# Patient Record
Sex: FEMALE | Race: BLACK OR AFRICAN AMERICAN
[De-identification: names, ages, dates, MRNs, and addresses within clinical notes are randomized per-mention and may not be internally consistent; named-entity substitution may affect disease eponyms.]

---

## 2019-11-14 ENCOUNTER — HOSPITAL ENCOUNTER (EMERGENCY)
Dept: HOSPITAL 75 - ER FS | Age: 19
Discharge: HOME | End: 2019-11-14
Payer: SELF-PAY

## 2019-11-14 VITALS — HEIGHT: 66.02 IN | BODY MASS INDEX: 22.14 KG/M2 | WEIGHT: 137.79 LBS

## 2019-11-14 DIAGNOSIS — K60.2: Primary | ICD-10-CM

## 2019-11-14 DIAGNOSIS — K64.9: ICD-10-CM

## 2019-11-14 PROCEDURE — 99282 EMERGENCY DEPT VISIT SF MDM: CPT

## 2019-11-14 NOTE — ED GI
General


Chief Complaint:  Rect Problems


Stated Complaint:  ANAL BLEEDING


Nursing Triage Note:  


PT. REPORTED SHE HAS BRIGHT RED BLOOD FROM HER RECTUM AFTER WASHING THAT SHE HAS




HAD FOR 2-3 WEEKS.


Source of Information:  Patient


Exam Limitations:  No Limitations





History of Present Illness


Date Seen by Provider:  Nov 14, 2019


Time Seen by Provider:  22:50


Initial Comments


The patient is a 18-year-old female who presents for evaluation of painless 

bright red blood from rectum over the last 2-3 weeks intermittently noted with 

all movements. She reports that her stool has a normal appearance but that she 

has a small amount of blood coming from her rectum after having a bowel 

movement. She denies any rectal pain or fever or she denies any rectal trauma or

anal sex. She denies any pelvic discharge or rectal discharge. She denies any 

abdominal discomfort. She is alert and oriented 4, calm, and appears to be in 

no distress.


Timing/Duration:  Other (2-3 weeks)


Severity/Quality:  Mild


Location:  Other (anal)


Radiation:  No Radiation


Activities at Onset:  None


Associated Symptoms:  Denies Symptoms





Allergies and Home Medications


Allergies


Coded Allergies:  


     No Known Drug Allergies (Unverified , 11/14/19)





Patient Home Medication List


Home Medication List Reviewed:  Yes





Review of Systems


Review of Systems


Constitutional:  no symptoms reported


EENTM:  No Symptoms Reported


Respiratory:  No Symptoms Reported


Cardiovascular:  No Symptoms Reported


Gastrointestinal:  Rectal Bleeding (minor anal bleeding)


Genitourinary:  No Symptoms Reported


Musculoskeletal:  no symptoms reported


Skin:  no symptoms reported


Psychiatric/Neurological:  No Symptoms Reported


Endocrine:  No Symptoms Reported


Hematologic/Lymphatic:  No Symptoms Reported





All Other Systems Reviewed


Negative Unless Noted:  Yes





Past Medical-Social-Family Hx


Past Med/Social Hx:  Reviewed Nursing Past Med/Soc Hx


Patient Social History


Recent Foreign Travel:  No


Contact w/Someone Who Travel:  No


Recent Infectious Disease Expo:  No


Recent Hopitalizations:  No


Ebola Symptoms:  Denies Symptoms Listed


Physical Abuse:  No


Sexual Abuse:  No


Mistreated:  No


Fear:  No





Seasonal Allergies


Seasonal Allergies:  No





Past Medical History


Surgeries:  No


Respiratory:  No


Cardiac:  No


Neurological:  No


Genitourinary:  No


Gastrointestinal:  No


Musculoskeletal:  No


Endocrine:  No


HEENT:  No


Cancer:  No


Psychosocial:  No


Integumentary:  No


Blood Disorders:  No





Physical Exam


Vital Signs





Vital Signs - First Documented








 11/14/19





 22:46


 


Temp 36.6


 


Pulse 99


 


Resp 18


 


B/P (MAP) 115/73


 


Pulse Ox 97


 


O2 Delivery Room Air





Capillary Refill :


Height/Weight/BMI


Height: '"


Weight: lbs. oz. kg; 22.00 BMI


Method:


General Appearance:  WD/WN, no apparent distress


HEENT:  PERRL/EOMI, normal ENT inspection


Neck:  non-tender, full range of motion, supple


Respiratory:  chest non-tender, lungs clear, normal breath sounds, no 

respiratory distress


Cardiovascular:  regular rate, rhythm, no edema, no JVD


Gastrointestinal:  normal bowel sounds, non tender, soft, other (minor rectal 

fissure, internal hemorrhoid palpated, no external hemorrhoids seen, no blood 

noted)


Extremities:  normal range of motion, non-tender, no pedal edema


Neurologic/Psychiatric:  CNs II-XII nml as tested, no motor/sensory deficits, 

alert, normal mood/affect, oriented x 3


Skin:  normal color, warm/dry





Progress/Results/Core Measures


Results/Orders


Vital Signs/I&O











 11/14/19





 22:46


 


Temp 36.6


 


Pulse 99


 


Resp 18


 


B/P (MAP) 115/73


 


Pulse Ox 97


 


O2 Delivery Room Air











Progress


Progress Note :  


Progress Note


@2313 - On examination the patient has a mild superficial anal fissure and a 

palpable small internal hemorrhoid. There is no bleeding noted at this time. 

There is no sign of perirectal abscess or concern for sexually transmitted in

fection based on the appearance. Advised the patient to follow up with GI for 

colonoscopy and further management. The patient expresses verbal understanding 

and agreement with the plan and is stable for discharge.





Departure


Impression





   Primary Impression:  


   Anal fissure


   Additional Impression:  


   Hemorrhoid


Disposition:  01 HOME, SELF-CARE


Condition:  Stable





Departure-Patient Inst.


Decision time for Depature:  23:15


Referrals:  


JOSE SHULTZ DO


Patient Instructions:  Anal Fissure, Hemorrhoids





Add. Discharge Instructions:  


Follow-up with your PCP or Dr. Shultz in the next 1-2 days. Return to the 

emergency Department immediately for new or worsening symptoms. Take the pr

escribed medicine as directed.


Scripts


Docusate Sodium (Colace) 100 Mg Capsule


100 MG PO DAILY for 30 Days, #30 CAP


   Prov: ADELIA AGOSTO DO         11/14/19











ADELIA AGOSTO DO              Nov 14, 2019 23:11


POS